# Patient Record
Sex: MALE | Race: WHITE | NOT HISPANIC OR LATINO | Employment: OTHER | URBAN - METROPOLITAN AREA
[De-identification: names, ages, dates, MRNs, and addresses within clinical notes are randomized per-mention and may not be internally consistent; named-entity substitution may affect disease eponyms.]

---

## 2025-02-17 ENCOUNTER — EVALUATION (OUTPATIENT)
Dept: PHYSICAL THERAPY | Facility: CLINIC | Age: 75
End: 2025-02-17
Payer: MEDICARE

## 2025-02-17 DIAGNOSIS — M23.41 LOOSE BODY IN KNEE, RIGHT KNEE: ICD-10-CM

## 2025-02-17 DIAGNOSIS — R26.2 DIFFICULTY WALKING DUE TO KNEE JOINT: ICD-10-CM

## 2025-02-17 DIAGNOSIS — M25.461 EFFUSION OF RIGHT KNEE: ICD-10-CM

## 2025-02-17 DIAGNOSIS — M17.11 ARTHRITIS OF RIGHT KNEE: Primary | ICD-10-CM

## 2025-02-17 PROBLEM — I10 ESSENTIAL HYPERTENSION: Status: ACTIVE | Noted: 2025-02-17

## 2025-02-17 PROCEDURE — 97162 PT EVAL MOD COMPLEX 30 MIN: CPT | Mod: GP

## 2025-02-17 NOTE — PROGRESS NOTES
Physical Therapy Evaluation    Patient Name: Jeffry Khan  MRN: [id number]  Evaluation Date: 2/17/2025  Time Calculation  Start Time: 0817  Stop Time: 0857  Time Calculation (min): 40 min  PT Evaluation Time Entry  PT Evaluation (Moderate) Time Entry: 36            Problem List Items Addressed This Visit             ICD-10-CM    Arthritis of right knee M17.11    Loose body in knee, right knee M23.41    Effusion of right knee M25.461    Difficulty walking due to knee joint R26.2       Subjective   General:  General  Referred By: Dr Urrutia    Patient reported hx of injury:   12/30/24 playing pickeleball and hyperextended R knee. Onset of R knee pain and swelling 2 days later.  Pt went to ER where Xrays were done and pt got  meds.  Pt saw Dr Urrutia who sent pt for MRI.  Pt got cortisone shot and has had a little less pain since.  Pt notes less swelling.  To PT.    Precautions:   HTN    Relevant PMH:  HTN, Gout, L TKR 6 years ago, kidney disease    Red flags: Do you have any of the following? No  Fever/chills, unexplained weight changes, dizziness/fainting, unexplained change in bowel or bladder functions, unexplained malaise or muscle weakness, night pain/sweats, numbness or tingling    Pain:     Pain description: Intermittent achiness in ant R knee.  Pt has constant dull pain lateral R foot which has been improving.  Foot pain does not change with walking- just constant    Pain at present: 0/10 now seated knee, R foot 2/10  Maximum pain: 2/10 knee, R foot 2/10  Minimum pain: 0/10    Pain improves with: shot  Pain worsens with: ascending stairs reciprocally    Pt notes his leg feels unstable and balance impaired.     Home Living:     Home type: House- 3 steps to enter, steps to basement with railing  Lives with: Spouse  Occupation: retired  Hobbies/activities: pickelball, gardening    Prior Function Per Pt/Caregiver Report:   Prior to 12/30/24 pt was painfree and able to perform ADLs without  limitation    Imaging:  MRI knee:  IMPRESSION:   1. Complex tearing of the medial meniscus body and posterior horn/root.   2.  Moderate tricompartmental osteoarthritis most pronounced in the patellofemoral compartment with extensive high-grade/full-thickness chondrosis.   3.  Well-corticated intra-articular ossification measuring up to 7 mm appearing to be within the lateral femoral condyle cartilage and likely representing an ossicle.   4.  Small joint effusion and tiny Baker's cyst.   5.  Mild popliteus tendinosis.   6.  Nonspecific soft tissue edema of the lateral subcutaneous tissues     Objective   Posture:   Ant head with forward rounded shoulders    Range of Motion:   B hip AROM is wnl.  L ankle AROM is wfl. R ankle DF to 0 deg, EV and INV min limited.     Knee AROM L R   Flexion 0-126 deg 7-121 deg   Extension 0 deg -7 deg deg         Strength:     LE MMT L R   Hip flexion 4/5 4-/5   Hip abduction 4-/5 3+/5   Hip adduction 4/5 4/5   Knee flexion 4+/5 4/5   Knee extension 4+/5 4-/5   Ankle DF 4/5 3-/5   Ankle PF NT 2+/5   Ankle eversion 4/5 3-/5   Ankle inversion 4/5 3-/5      Flexibility:   Tight hamstrings R    Palpation:   Tender R 5th MT    Joint Assessment:      Special Tests:     Patellar Tracking:  limited patella mobility noted    Gait:   Pt ambulates with R toe out and knee slightly flexed.  Mild antalgic gait.    Balance:   SLS without UE support: 10 secs on L and <1 sec on R    Transfers:   B UE assist for sit to stand    Outcome Measures:  WOMAC 23/96     Assessment       Pt is a 74 y.o. male who presents with impairments of  R knee pain, decreased ROM and muscle weakness. These impairments have led to functional limitations including tolerance to climbing stairs, impaired balance and R knee feeling unstable with ADLs. Pt would benefit from skilled physical therapy intervention to improve above impairments and facilitate return to function.    Complexity of Evaluation: Moderate    Based on the  history including personal factors and/or comorbidities, examination of body systems including body structures and function, activity limitations, and/or participation restrictions, as well as clinical presentation, patient meets criteria for a Moderate complexity evaluation.    Plan  Treatment/Interventions: Aquatic therapy, Education/ Instruction, Electrical stimulation, Manual therapy, Neuromuscular re-education, Taping techniques, Therapeutic activities, Therapeutic exercises, Ultrasound  PT Plan: Skilled PT  PT Frequency:  (1-2x/week for 10 visits upon auth)  Certification Period Start Date: 02/17/25  Certification Period End Date: 05/18/25  Number of Treatments Authorized: EVAL ONLY, AUTH REQ. 30.00 CO PAY, 100% COVERAGE, ANTHEM  Rehab Potential: Good  Plan of Care Agreement: Patient    Insurance Plan: Payor: ANTHEM MEDICARE / Plan: ANTHEM MEDICARE ADVANTAGE / Product Type: *No Product type* /     Plan for next visit: Begin aquatic PT to strengthen R LE, improve flexibility and tolerance to functional activities.  Pt may also perform land based PT to address strength, mobility and balance so pt can resume previous ADLs.  Manual or modalities may be used if indicated. Pt education as necessary.    OP EDUCATION:  Outpatient Education  Individual(s) Educated: Patient  Education Provided: Anatomy, Body Mechanics, Home Exercise Program, Physiology, POC  Risk and Benefits Discussed with Patient/Caregiver/Other: yes  Patient/Caregiver Demonstrated Understanding: yes  Plan of Care Discussed and Agreed Upon: yes  Patient Response to Education: Patient/Caregiver Verbalized Understanding of Information, Patient/Caregiver Asked Appropriate Questions    Today's Treatment:  No treatment billed today as pt requires prior authorization  PT and pt discussed exs he was given by physician.  He has not tried them too much as he has to lay on floor to perform them.  Pt was encouraged to try these exs on his bed instead and pt is  to bring copy of exs to next PT session.    Goals:  STGs to be met in 6 visits:  Pt will be able to perform 40 min aquatic program without increased pain.  Pt will move sit to stand without UE assist.    LTGs to be met in 10 visits:  Pt will be demonstrate increased R knee AROM so pt can extend R knee fully.  Pt will be able to SLS on R LE > 5 secs without UE support.  Pt will demonstrate increased strength in R LE by > 1/2 MMT grade so pt can ascend stairs reciprocally.  Pt will be independent with HEP to allow pt to resume ADLs including pickleball without knee pain.    Pt's goal: Normal walking and going up stairs

## 2025-02-19 ENCOUNTER — TREATMENT (OUTPATIENT)
Dept: PHYSICAL THERAPY | Facility: CLINIC | Age: 75
End: 2025-02-19
Payer: MEDICARE

## 2025-02-19 DIAGNOSIS — M17.11 ARTHRITIS OF RIGHT KNEE: ICD-10-CM

## 2025-02-19 DIAGNOSIS — M25.461 EFFUSION OF RIGHT KNEE: ICD-10-CM

## 2025-02-19 DIAGNOSIS — R26.2 DIFFICULTY WALKING DUE TO KNEE JOINT: ICD-10-CM

## 2025-02-19 DIAGNOSIS — M23.41 LOOSE BODY IN KNEE, RIGHT KNEE: ICD-10-CM

## 2025-02-19 PROCEDURE — 97113 AQUATIC THERAPY/EXERCISES: CPT | Mod: GP

## 2025-02-19 NOTE — PROGRESS NOTES
"    Physical Therapy Treatment    Patient Name: Jeffry Khan  MRN: [id number]  Encounter date:  2/19/2025  Time Calculation  Start Time: 0915  Stop Time: 0959  Time Calculation (min): 44 min     PT Therapeutic Procedures Time Entry  Aquatic Therapy Time Entry: 43       Visit Number:  2 (including evaluation)  Planned total visits: eval+5  Visits Authorized/Insurance Coverage:  EVAL ONLY-Auth Rcvd 5 visits 2/19-4/19 CPTs [phone]  [id number]  EVAL ONLY, AUTH REQ. 30.00 CO PAY, 100% COVERAGE, ALL     Current Problem  Problem List Items Addressed This Visit             ICD-10-CM    Arthritis of right knee M17.11    Loose body in knee, right knee M23.41    Effusion of right knee M25.461    Difficulty walking due to knee joint R26.2       Precautions   HTN     Pain   0-1/10 knee    Subjective  General   Pt reports he gets an occasional twinge in his R knee.  His R lateral foot gets sore with walking wihtout shoes on.          Objective  Pt toes out on R during gait in and out of pool    Treatment:    Aquatic Therapy:    Pt enters and exits pool via steps with B rails    Pt walks forward, retro and sidestep 3 laps each    At rail:  B heel raises - DNP d/t foot pain  SLR R/L 10x  Hip abd R/L 10x  Hip ext toe touch R/L 10x  Hamstring curls R/L 10x    March across pool 2 laps    Deeper end with noodle:  Bicycling 2 min  Hip abd 2 min  Hanging 1 min  Cross country ski  Bicycling 2 min  Hanging 1 min    Walk sidestep quickly 2 laps  Walk forward 2 laps without pain    Hamstring stretch at third step 20\"x2 R/L      Current HEP:  From physician: ITBand stretch, quad stretch, heel slides, quad set, SLR, SL hip add, SL hip, prone hip ext    Has patient been compliant with HEP? Yes    Activity tolerance:  Fair    OP EDUCATION:   Rationale for exs    Assessment:     Pt's response to treatment:  Pt had some pain in R Knee with forward ambulation in water at beginning of session but was able to walk " I can see him as he is an established patient forward at end of session without knee pain.  Areas of improvements:  no increased pain post session  Limitations/deficits:  R knee with ADLs    Pain end of session: 0-1/10    Plan:     Continue with current POC with the following changes assess tolerance    Assessment of current progress against goals:  Insufficient treatment time to assess progress    Goals:  STGs to be met in 6 visits:  Pt will be able to perform 40 min aquatic program without increased pain.  Pt will move sit to stand without UE assist.    LTGs to be met in 10 visits:  Pt will be demonstrate increased R knee AROM so pt can extend R knee fully.  Pt will be able to SLS on R LE > 5 secs without UE support.  Pt will demonstrate increased strength in R LE by > 1/2 MMT grade so pt can ascend stairs reciprocally.  Pt will be independent with HEP to allow pt to resume ADLs including pickleball without knee pain.    Pt's goal: Normal walking and going up stairs

## 2025-02-24 ENCOUNTER — TREATMENT (OUTPATIENT)
Dept: PHYSICAL THERAPY | Facility: CLINIC | Age: 75
End: 2025-02-24
Payer: MEDICARE

## 2025-02-24 DIAGNOSIS — R26.2 DIFFICULTY WALKING DUE TO KNEE JOINT: ICD-10-CM

## 2025-02-24 DIAGNOSIS — M25.461 EFFUSION OF RIGHT KNEE: ICD-10-CM

## 2025-02-24 DIAGNOSIS — M17.11 ARTHRITIS OF RIGHT KNEE: ICD-10-CM

## 2025-02-24 DIAGNOSIS — M23.41 LOOSE BODY IN KNEE, RIGHT KNEE: ICD-10-CM

## 2025-02-24 PROCEDURE — 97113 AQUATIC THERAPY/EXERCISES: CPT | Mod: GP

## 2025-02-24 NOTE — PROGRESS NOTES
"    Physical Therapy Treatment    Patient Name: Jeffry Khan  MRN: [id number]  Encounter date:  2/24/2025  Time Calculation  Start Time: 1607  Stop Time: 1648  Time Calculation (min): 41 min     PT Therapeutic Procedures Time Entry  Aquatic Therapy Time Entry: 41       Visit Number:  3 (including evaluation)  Planned total visits: eval+5  Visits Authorized/Insurance Coverage:  EVAL ONLY-Auth Rcvd 5 visits 2/19-4/19 CPTs [id number] [phone]  [id number]  EVAL ONLY, AUTH REQ. 30.00 CO PAY, 100% COVERAGE, ALL     Current Problem  Problem List Items Addressed This Visit             ICD-10-CM    Arthritis of right knee M17.11    Loose body in knee, right knee M23.41    Effusion of right knee M25.461    Difficulty walking due to knee joint R26.2         Precautions   HTN     Pain   0/10 knee    Subjective  General   Pt reports he is feeling good today.  No foot or knee pain.  Pt states his knee flexion is limited on the R vs the L when working out at [facility]     Objective  Non-antalgic gait    Treatment:    Aquatic Therapy:    Pt enters and exits pool via steps with B rails    Pt walks forward, retro and sidestep 3 laps each    At rail:  B heel raises - DNP d/t foot pain  SLR R/L 10x2  Hip abd R/L 10x2  Hip ext toe touch R/L 10x2  Hamstring curls R/L 10x2    March across pool 3 laps    Deeper end with noodle:  Bicycling 2 min  Hip abd 2 min  Hanging 1 min  Cross country ski  Bicycling 2 min    Walk forward 2 laps in 4 foot water   Mini squats 10x    Hamstring stretch at third step 30\"x2 R/L  Gastroc stretch at wall 20\"x3 R/L    Current HEP:  From physician: ITBand stretch, quad stretch, heel slides, quad set, SLR, SL hip add, SL hip, prone hip ext    Has patient been compliant with HEP? Yes    Activity tolerance:  Fair    OP EDUCATION:   Rationale for exs    Assessment:     Pt's response to treatment:  Pt able to walk forward in water without increased pain in knee today.  Pt able to increase reps of exs " without pain.   Areas of improvements:  no increased pain post session  Limitations/deficits:  R knee with ADLs    Pain end of session: 0/10    Plan:     Continue with current POC with the following changes pt to begin land based PT next session to get HEP and exs he can perform at gym    Assessment of current progress against goals:  Progressing toward functional goals    Goals:  STGs to be met in 6 visits:  Pt will be able to perform 40 min aquatic program without increased pain.  Pt will move sit to stand without UE assist.    LTGs to be met in 10 visits:  Pt will be demonstrate increased R knee AROM so pt can extend R knee fully.  Pt will be able to SLS on R LE > 5 secs without UE support.  Pt will demonstrate increased strength in R LE by > 1/2 MMT grade so pt can ascend stairs reciprocally.  Pt will be independent with HEP to allow pt to resume ADLs including pickleball without knee pain.    Pt's goal: Normal walking and going up stairs

## 2025-02-27 ENCOUNTER — CLINICAL SUPPORT (OUTPATIENT)
Dept: PHYSICAL THERAPY | Facility: CLINIC | Age: 75
End: 2025-02-27
Payer: MEDICARE

## 2025-02-27 DIAGNOSIS — M17.11 ARTHRITIS OF RIGHT KNEE: ICD-10-CM

## 2025-02-27 DIAGNOSIS — R26.2 DIFFICULTY WALKING DUE TO KNEE JOINT: ICD-10-CM

## 2025-02-27 DIAGNOSIS — M25.461 EFFUSION OF RIGHT KNEE: ICD-10-CM

## 2025-02-27 DIAGNOSIS — M23.41 LOOSE BODY IN KNEE, RIGHT KNEE: ICD-10-CM

## 2025-02-27 PROCEDURE — 97110 THERAPEUTIC EXERCISES: CPT | Mod: GP,CQ

## 2025-02-27 NOTE — PROGRESS NOTES
Physical Therapy Treatment    Patient Name: Jeffry Khan  MRN: [id number]  Encounter date:  2/27/2025  Time Calculation  Start Time: 0900  Stop Time: 0945  Time Calculation (min): 45 min     PT Therapeutic Procedures Time Entry  Therapeutic Exercise Time Entry: 40       Visit Number:  4 (including evaluation)  Planned total visits: eval+5  Visits Authorized/Insurance Coverage:  EVAL ONLY-Auth Rcvd 5 visits 2/19-4/19 CPTs [id number] [phone]  [id number]  EVAL ONLY, AUTH REQ. 30.00 CO PAY, 100% COVERAGE, ANTHFLAVIO     Current Problem  Problem List Items Addressed This Visit             ICD-10-CM    Arthritis of right knee M17.11    Loose body in knee, right knee M23.41    Effusion of right knee M25.461    Difficulty walking due to knee joint R26.2     Precautions   HTN     Pain   0/10     Subjective  General  No soreness after pool last session      Objective  Fair + balalnce  Greater challenge SLS R versus L     Treatment:  Therapeutic exercises   - Heel Raises with Counter Support  - -2x10  - Toe Raises with Counter Support  2x10 reps  - Gastroc Stretch on Wall  -2 reps - 20-30 sec  hold  - Seated Hamstring Stretch  - 2 reps - 20-30 sec hold  - step ups with [employer]ebok step fwd and lateral R/L - UE support with lateral   - TKE rainbow ball 2x 10 R/L   - STS with air ex, hands on thigh 2x10  - performed first few reps without air ex pad demonstrating fair eccentric control.   - SLS LLE with PRN UE support 2x20 sec   -SLS RLE with sinlge UE support 2x20 sec   -step and hold air ex pad fwd and lateral x 10 R/L     Current HEP:  Access Code: [email or web address]  URL: [email or web address]  Date: 02/27/2025  Prepared by: Leona Eaton    Exercises  - ITB Stretch at Wall  - 1 x daily - 5 x weekly - 2 sets - 10 reps  - Sidelying Hip Abduction  - 1 x daily - 5 x weekly - 2 sets - 10 reps  - Sidelying Hip Adduction  - 1 x daily - 5 x weekly - 2 sets - 10 reps  - Sidelying Quadriceps Stretch with Strap  - 1 x daily - 5  x weekly - 2 sets - 10 reps  - Heel Raises with Counter Support  - 1 x daily - 5 x weekly - 2 sets - 10 reps  - Toe Raises with Counter Support  - 1 x daily - 5 x weekly - 2 sets - 10 reps  - Supine Quadricep Sets  - 1 x daily - 5 x weekly - 2 sets - 10 reps  - Active Straight Leg Raise with Quad Set  - 1 x daily - 5 x weekly - 2 sets - 10 reps  - Prone Hip Extension  - 1 x daily - 5 x weekly - 2 sets - 10 reps  - Gastroc Stretch on Wall  - 1 x daily - 5 x weekly - 3 reps - 20-30 sec  hold  - Seated Hamstring Stretch  - 1 x daily - 5 x weekly - 3 reps - 20-30 sec hold    Has patient been compliant with HEP? Yes    Activity tolerance:  Fair    OP EDUCATION:   Rationale for exs  Assessment:   Land instructed today to assess and add to the patients HEP given by doctor. Also, introduced additional exercises that address strength and ROM. The patient appeared to tolerate well.   Pt's response to treatment: Good   Areas of improvements:  Improved stability and Improved strength  Limitations/deficits:  Unstable    Pain end of session:  0    Assessment of current progress against goals:  Progressing toward functional goals     Plan:     Continue with current POC with the following changes pt to begin land based PT next session to get HEP and exs he can perform at gym  Treatment/Interventions: Aquatic therapy, Education/ Instruction, Electrical stimulation, Manual therapy, Neuromuscular re-education, Taping techniques, Therapeutic activities, Therapeutic exercises, Ultrasound  PT Plan: Skilled PT  PT Frequency:  (1-2x/week for 10 visits upon auth)  Certification Period Start Date: 02/17/25  Certification Period End Date: 05/18/25  Number of Treatments Authorized: EVAL ONLY, AUTH REQ. 30.00 CO PAY, 100% COVERAGE, ANTHEM  Rehab Potential: Good  Plan of Care Agreement: Patient    Goals:  STGs to be met in 6 visits:  Pt will be able to perform 40 min aquatic program without increased pain.  Pt will move sit to stand without UE  assist.    LTGs to be met in 10 visits:  Pt will be demonstrate increased R knee AROM so pt can extend R knee fully.  Pt will be able to SLS on R LE > 5 secs without UE support.  Pt will demonstrate increased strength in R LE by > 1/2 MMT grade so pt can ascend stairs reciprocally.  Pt will be independent with HEP to allow pt to resume ADLs including pickleball without knee pain.    Pt's goal: Normal walking and going up stairs

## 2025-03-04 ENCOUNTER — CLINICAL SUPPORT (OUTPATIENT)
Dept: PHYSICAL THERAPY | Facility: CLINIC | Age: 75
End: 2025-03-04
Payer: MEDICARE

## 2025-03-04 DIAGNOSIS — M25.461 EFFUSION OF RIGHT KNEE: ICD-10-CM

## 2025-03-04 DIAGNOSIS — M23.41 LOOSE BODY IN KNEE, RIGHT KNEE: ICD-10-CM

## 2025-03-04 DIAGNOSIS — R26.2 DIFFICULTY WALKING DUE TO KNEE JOINT: ICD-10-CM

## 2025-03-04 DIAGNOSIS — M17.11 ARTHRITIS OF RIGHT KNEE: ICD-10-CM

## 2025-03-04 PROCEDURE — 97110 THERAPEUTIC EXERCISES: CPT | Mod: GP,CQ

## 2025-03-04 NOTE — PROGRESS NOTES
"    Physical Therapy Treatment    Patient Name: Jeffry Khan  MRN: [id number]  Encounter date:  3/4/2025  Time Calculation  Start Time: 0920  Stop Time: 1005  Time Calculation (min): 45 min     PT Therapeutic Procedures Time Entry  Therapeutic Exercise Time Entry: 40       Visit Number:  5 (including evaluation)  Planned total visits: eval+5  Visits Authorized/Insurance Coverage:  EVAL ONLY-Auth Rcvd 5 visits 2/19-4/19 CPTs [id number] [phone]  [phone]  EVAL ONLY, AUTH REQ. 30.00 CO PAY, 100% COVERAGE, ANTHFLAVIO     Current Problem  Problem List Items Addressed This Visit             ICD-10-CM    Arthritis of right knee M17.11    Loose body in knee, right knee M23.41    Effusion of right knee M25.461    Difficulty walking due to knee joint R26.2     Precautions   HTN     Pain   0/10     Subjective  General  \"I did a couple miles on the recumbent bike Friday. No soreness.\"   \"I did the exercises you gave me yesterday. No trouble.\"       Objective  Tight hamstrings     Treatment:  Therapeutic exercises   Nustep level 2 5 min with UE     Moflex calf stretch 20\"x 3 R/L     Seated on air ex   LAQ 1.5#  2 sec hold with adduction 3x10   - Seated Hamstring Stretch  - 3 x  20 sec hold    Standing with UE support   HS curls 1.5# 2 sec hold  3x10  Hip abduction with eccentric focus 1.5# 3x10   Hip ext 1.5# 3x10   - step ups with [employer]ebok step fwd and lateral R/L - UE support with lateral - cycle 2x10 ea       DNP 3/4   - TKE rainbow ball 2x 10 R/L   - STS with air ex, hands on thigh 2x10  - performed first few reps without air ex pad demonstrating fair eccentric control.   - SLS LLE with PRN UE support 2x20 sec   - SLS RLE with sinlge UE support 2x20 sec   - step and hold air ex pad fwd and lateral x 10 R/L     Current HEP:  Access Code: [email or web address]  URL: [email or web address]  Date: 02/27/2025  Prepared by: Leona Eaton    Exercises  - ITB Stretch at Wall  - 1 x daily - 5 x weekly - 2 sets - 10 reps  - " Sidelying Hip Abduction  - 1 x daily - 5 x weekly - 2 sets - 10 reps  - Sidelying Hip Adduction  - 1 x daily - 5 x weekly - 2 sets - 10 reps  - Sidelying Quadriceps Stretch with Strap  - 1 x daily - 5 x weekly - 2 sets - 10 reps  - Heel Raises with Counter Support  - 1 x daily - 5 x weekly - 2 sets - 10 reps  - Toe Raises with Counter Support  - 1 x daily - 5 x weekly - 2 sets - 10 reps  - Supine Quadricep Sets  - 1 x daily - 5 x weekly - 2 sets - 10 reps  - Active Straight Leg Raise with Quad Set  - 1 x daily - 5 x weekly - 2 sets - 10 reps  - Prone Hip Extension  - 1 x daily - 5 x weekly - 2 sets - 10 reps  - Gastroc Stretch on Wall  - 1 x daily - 5 x weekly - 3 reps - 20-30 sec  hold  - Seated Hamstring Stretch  - 1 x daily - 5 x weekly - 3 reps - 20-30 sec hold    Has patient been compliant with HEP? Yes    Activity tolerance:  Fair    OP EDUCATION:   Rationale for exs    Assessment:   The patient did well with 1.5# cuff weights this session during exercise instruction.   Pt's response to treatment: Good   Areas of improvements:  Improved stability and Improved strength  Limitations/deficits:  Pain limits stair negotiation     Pain end of session:  0    Assessment of current progress against goals:  Progressing toward functional goals     Plan:     Continue with current POC with the following changes pt to begin land based PT next session to get HEP and exs he can perform at gym  Treatment/Interventions: Aquatic therapy, Education/ Instruction, Electrical stimulation, Manual therapy, Neuromuscular re-education, Taping techniques, Therapeutic activities, Therapeutic exercises, Ultrasound  PT Plan: Skilled PT  PT Frequency:  (1-2x/week for 10 visits upon auth)  Certification Period Start Date: 02/17/25  Certification Period End Date: 05/18/25  Number of Treatments Authorized: EVAL ONLY, AUTH REQ. 30.00 CO PAY, 100% COVERAGE, ANTHEM  Rehab Potential: Good  Plan of Care Agreement: Patient    Goals:  STGs to be met  in 6 visits:  Pt will be able to perform 40 min aquatic program without increased pain.  Pt will move sit to stand without UE assist.    LTGs to be met in 10 visits:  Pt will be demonstrate increased R knee AROM so pt can extend R knee fully.  Pt will be able to SLS on R LE > 5 secs without UE support.  Pt will demonstrate increased strength in R LE by > 1/2 MMT grade so pt can ascend stairs reciprocally.  Pt will be independent with HEP to allow pt to resume ADLs including pickleball without knee pain.    Pt's goal: Normal walking and going up stairs

## 2025-03-11 ENCOUNTER — TREATMENT (OUTPATIENT)
Dept: PHYSICAL THERAPY | Facility: CLINIC | Age: 75
End: 2025-03-11
Payer: MEDICARE

## 2025-03-11 DIAGNOSIS — M23.41 LOOSE BODY IN KNEE, RIGHT KNEE: ICD-10-CM

## 2025-03-11 DIAGNOSIS — M25.461 EFFUSION OF RIGHT KNEE: ICD-10-CM

## 2025-03-11 DIAGNOSIS — R26.2 DIFFICULTY WALKING DUE TO KNEE JOINT: ICD-10-CM

## 2025-03-11 DIAGNOSIS — M17.11 ARTHRITIS OF RIGHT KNEE: ICD-10-CM

## 2025-03-11 PROCEDURE — 97110 THERAPEUTIC EXERCISES: CPT | Mod: GP

## 2025-03-11 NOTE — PROGRESS NOTES
"    Physical Therapy Treatment/Progress Note    Patient Name: Jeffry Khan  MRN: [id number]  Encounter date:  3/11/2025  Time Calculation  Start Time: 0901  Stop Time: 0945  Time Calculation (min): 44 min     PT Therapeutic Procedures Time Entry  Therapeutic Exercise Time Entry: 40       Visit Number:  6 (including evaluation)  Planned total visits: eval+5  Visits Authorized/Insurance Coverage:  EVAL ONLY-Auth Rcvd 5 visits 2/19-4/19 CPTs [id number]  [id number]  EVAL ONLY, AUTH REQ. 30.00 CO PAY, 100% COVERAGE, ALL     Current Problem  Problem List Items Addressed This Visit             ICD-10-CM    Arthritis of right knee M17.11    Loose body in knee, right knee M23.41    Effusion of right knee M25.461    Difficulty walking due to knee joint R26.2       Precautions   HTN     Pain   1/10     Subjective  General  Pt reports he was able to do his routine at the [facility] yesterday.  He rode over 2 miles on recumbent bike and did arm and leg machines.         Objective  Pt able to ascend 7.5 inch stairs reciprocally    Treatment:  THERAPEUTIC EXS:  Nustep level 2 for 5 min with UE     B heel raises 12x  Moflex calf stretch 20\"x 3 R/L     PT and pt reviewed HEP and discussed pt's HEP and ex routine.    Ant step up 7.5\"step with rail 10x R/L  Lat step up 7.5\" step with rail 10x R/L    TKE rainbow ball 2x 10 R/L   STS with air ex, hands on thigh 2x10    SLS 10\"x3 R/L with 0-1 UE assist    Current HEP:  Access Code: [email or web address]  URL: [email or web address]  Date: 02/27/2025  Prepared by: Leona Eaton    Exercises  - ITB Stretch at Wall  - 1 x daily - 5 x weekly - 2 sets - 10 reps  - Sidelying Hip Abduction  - 1 x daily - 5 x weekly - 2 sets - 10 reps  - Sidelying Hip Adduction  - 1 x daily - 5 x weekly - 2 sets - 10 reps  - Sidelying Quadriceps Stretch with Strap  - 1 x daily - 5 x weekly - 2 sets - 10 reps  - Heel Raises with Counter Support  - 1 x daily - 5 x weekly - 2 sets - 10 reps  - Toe " Raises with Counter Support  - 1 x daily - 5 x weekly - 2 sets - 10 reps  - Supine Quadricep Sets  - 1 x daily - 5 x weekly - 2 sets - 10 reps  - Active Straight Leg Raise with Quad Set  - 1 x daily - 5 x weekly - 2 sets - 10 reps  - Prone Hip Extension  - 1 x daily - 5 x weekly - 2 sets - 10 reps  - Gastroc Stretch on Wall  - 1 x daily - 5 x weekly - 3 reps - 20-30 sec  hold  - Seated Hamstring Stretch  - 1 x daily - 5 x weekly - 3 reps - 20-30 sec hold    Has patient been compliant with HEP? Yes    Activity tolerance:  Fair    OP EDUCATION:   Rationale for exs    Assessment:     Pt's response to treatment: Pt is progressing toward goals   Areas of improvements:  I with HEP      Pain end of session:  0/10    Assessment of current progress against goals:  Progressing toward functional goals     Plan:   Hold PT for one month.  Pt to call if increased pain, but is to continue I HEP      Goals:  STGs to be met in 6 visits:  Pt will be able to perform 40 min aquatic program without increased pain.- MET  Pt will move sit to stand without UE assist.- MET    LTGs to be met in 10 visits:  Pt will be demonstrate increased R knee AROM so pt can extend R knee fully.- MET  Pt will be able to SLS on R LE > 5 secs without UE support.- PROGRESSING  Pt will demonstrate increased strength in R LE by > 1/2 MMT grade so pt can ascend stairs reciprocally.- MET  Pt will be independent with HEP to allow pt to resume ADLs including pickleball without knee pain.- MET    Pt's goal: Normal walking and going up stairs- MET

## 2025-04-25 ENCOUNTER — DOCUMENTATION (OUTPATIENT)
Dept: PHYSICAL THERAPY | Facility: CLINIC | Age: 75
End: 2025-04-25
Payer: MEDICARE

## 2025-04-25 NOTE — PROGRESS NOTES
Physical Therapy    Discharge Summary    Name: Jeffry Khan  MRN: [id number]  : 1950  Date: 25    Discharge Summary: PT    Discharge Information: Date of last visit 3/11/25 and Number of attended visits eval+5    Therapy Summary: At patient's last appointment, pt had achieved goals and was independent with HEP.  Pt was placed on hold for one month and did not call or return to PT.    Rehab Discharge Reason: Achieved all and/or the most significant goals(s)